# Patient Record
Sex: MALE | Race: OTHER | NOT HISPANIC OR LATINO | URBAN - METROPOLITAN AREA
[De-identification: names, ages, dates, MRNs, and addresses within clinical notes are randomized per-mention and may not be internally consistent; named-entity substitution may affect disease eponyms.]

---

## 2018-06-18 ENCOUNTER — EMERGENCY (EMERGENCY)
Facility: HOSPITAL | Age: 53
LOS: 1 days | Discharge: HOME | End: 2018-06-18
Admitting: EMERGENCY MEDICINE
Payer: MEDICAID

## 2018-06-18 VITALS
HEART RATE: 64 BPM | TEMPERATURE: 98 F | SYSTOLIC BLOOD PRESSURE: 136 MMHG | RESPIRATION RATE: 18 BRPM | OXYGEN SATURATION: 98 % | DIASTOLIC BLOOD PRESSURE: 76 MMHG

## 2018-06-18 PROCEDURE — 99283 EMERGENCY DEPT VISIT LOW MDM: CPT | Mod: 25

## 2018-06-18 PROCEDURE — 73562 X-RAY EXAM OF KNEE 3: CPT | Mod: 26,RT

## 2018-06-18 RX ORDER — IBUPROFEN 200 MG
800 TABLET ORAL ONCE
Qty: 0 | Refills: 0 | Status: COMPLETED | OUTPATIENT
Start: 2018-06-18 | End: 2018-06-18

## 2018-06-18 RX ADMIN — Medication 800 MILLIGRAM(S): at 13:37

## 2018-06-18 NOTE — ED PROVIDER NOTE - PHYSICAL EXAMINATION
R knee: no erythema, edema, warmth, effusion. no bony point tenderness. mild TTP Lateral knee. NO skin changes or signs of traum. 5/5 strength. FROM - pain w/ resistance to flexion only. +2 pulses. <2 cap refill. sensation intact    VITAL SIGNS: I have reviewed nursing notes and confirm.  CONSTITUTIONAL: Well-developed; well-nourished; in no acute distress.  SKIN: Skin is warm and dry, no acute rash.  HEAD: Normocephalic; atraumatic.  EYES: PERRL, EOM intact; conjunctiva and sclera clear.  ENT: No nasal discharge; airway clear.  NECK: Supple; non tender.  CARD: S1, S2 normal; no murmurs, gallops, or rubs. Regular rate and rhythm.  RESP: No wheezes, rales or rhonchi.  ABD: Normal bowel sounds; soft; non-distended; non-tender; no hepatosplenomegaly.  ALL OTHER EXT: Normal ROM. No clubbing, cyanosis or edema.  NEURO: Alert, oriented. Grossly unremarkable.  PSYCH: Cooperative, appropriate.

## 2018-06-18 NOTE — ED PROVIDER NOTE - MEDICAL DECISION MAKING DETAILS
Will get xrays, place ACE bandage, give RICE instructions. Will have pt f/u with ortho for likely MRI

## 2018-06-18 NOTE — ED ADULT NURSE NOTE - CHPI ED SYMPTOMS NEG
no numbness/no fever/no back pain/no stiffness/no weakness/no deformity/no tingling/no bruising/no abrasion
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
## 2018-06-22 DIAGNOSIS — S89.91XA UNSPECIFIED INJURY OF RIGHT LOWER LEG, INITIAL ENCOUNTER: ICD-10-CM

## 2018-06-22 DIAGNOSIS — M25.561 PAIN IN RIGHT KNEE: ICD-10-CM
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
## 2018-11-28 ENCOUNTER — EMERGENCY (EMERGENCY)
Facility: HOSPITAL | Age: 53
LOS: 1 days | Discharge: HOME | End: 2018-11-28
Admitting: EMERGENCY MEDICINE
Payer: MEDICAID

## 2018-11-28 VITALS
OXYGEN SATURATION: 97 % | SYSTOLIC BLOOD PRESSURE: 117 MMHG | RESPIRATION RATE: 18 BRPM | TEMPERATURE: 99 F | HEART RATE: 94 BPM | DIASTOLIC BLOOD PRESSURE: 75 MMHG

## 2018-11-28 LAB
ALBUMIN SERPL ELPH-MCNC: 3.8 G/DL — SIGNIFICANT CHANGE UP (ref 3.4–5)
ALP SERPL-CCNC: 54 U/L — SIGNIFICANT CHANGE UP (ref 40–120)
ALT FLD-CCNC: 17 U/L — SIGNIFICANT CHANGE UP (ref 12–42)
ANION GAP SERPL CALC-SCNC: 9 MMOL/L — SIGNIFICANT CHANGE UP (ref 9–16)
AST SERPL-CCNC: 16 U/L — SIGNIFICANT CHANGE UP (ref 15–37)
BASOPHILS NFR BLD AUTO: 0.3 % — SIGNIFICANT CHANGE UP (ref 0–2)
BILIRUB SERPL-MCNC: 0.6 MG/DL — SIGNIFICANT CHANGE UP (ref 0.2–1.2)
BUN SERPL-MCNC: 11 MG/DL — SIGNIFICANT CHANGE UP (ref 7–23)
CALCIUM SERPL-MCNC: 9.2 MG/DL — SIGNIFICANT CHANGE UP (ref 8.5–10.5)
CHLORIDE SERPL-SCNC: 104 MMOL/L — SIGNIFICANT CHANGE UP (ref 96–108)
CO2 SERPL-SCNC: 26 MMOL/L — SIGNIFICANT CHANGE UP (ref 22–31)
CREAT SERPL-MCNC: 1.33 MG/DL — HIGH (ref 0.5–1.3)
EOSINOPHIL NFR BLD AUTO: 1 % — SIGNIFICANT CHANGE UP (ref 0–6)
FLUAV SPEC QL CULT: NEGATIVE — SIGNIFICANT CHANGE UP
FLUBV AG SPEC QL IA: NEGATIVE — SIGNIFICANT CHANGE UP
GLUCOSE SERPL-MCNC: 117 MG/DL — HIGH (ref 70–99)
HCT VFR BLD CALC: 42 % — SIGNIFICANT CHANGE UP (ref 39–50)
HGB BLD-MCNC: 14.7 G/DL — SIGNIFICANT CHANGE UP (ref 13–17)
IMM GRANULOCYTES NFR BLD AUTO: 0.3 % — SIGNIFICANT CHANGE UP (ref 0–1.5)
LYMPHOCYTES # BLD AUTO: 8.3 % — LOW (ref 13–44)
MCHC RBC-ENTMCNC: 32.9 PG — SIGNIFICANT CHANGE UP (ref 27–34)
MCHC RBC-ENTMCNC: 35 G/DL — SIGNIFICANT CHANGE UP (ref 32–36)
MCV RBC AUTO: 94 FL — SIGNIFICANT CHANGE UP (ref 80–100)
MONOCYTES NFR BLD AUTO: 9.6 % — SIGNIFICANT CHANGE UP (ref 2–14)
NEUTROPHILS NFR BLD AUTO: 80.5 % — HIGH (ref 43–77)
NT-PROBNP SERPL-SCNC: 85 PG/ML — SIGNIFICANT CHANGE UP
PLATELET # BLD AUTO: 240 K/UL — SIGNIFICANT CHANGE UP (ref 150–400)
POTASSIUM SERPL-MCNC: 3.8 MMOL/L — SIGNIFICANT CHANGE UP (ref 3.5–5.3)
POTASSIUM SERPL-SCNC: 3.8 MMOL/L — SIGNIFICANT CHANGE UP (ref 3.5–5.3)
PROT SERPL-MCNC: 7.9 G/DL — SIGNIFICANT CHANGE UP (ref 6.4–8.2)
RBC # BLD: 4.47 M/UL — SIGNIFICANT CHANGE UP (ref 4.2–5.8)
RBC # FLD: 13.6 % — SIGNIFICANT CHANGE UP (ref 10.3–14.5)
SODIUM SERPL-SCNC: 139 MMOL/L — SIGNIFICANT CHANGE UP (ref 132–145)
TROPONIN I SERPL-MCNC: <0.017 NG/ML — LOW (ref 0.02–0.06)
WBC # BLD: 7.8 K/UL — SIGNIFICANT CHANGE UP (ref 3.8–10.5)
WBC # FLD AUTO: 7.8 K/UL — SIGNIFICANT CHANGE UP (ref 3.8–10.5)

## 2018-11-28 PROCEDURE — 93010 ELECTROCARDIOGRAM REPORT: CPT

## 2018-11-28 PROCEDURE — 71046 X-RAY EXAM CHEST 2 VIEWS: CPT | Mod: 26

## 2018-11-28 PROCEDURE — 99285 EMERGENCY DEPT VISIT HI MDM: CPT | Mod: 25

## 2018-11-28 RX ORDER — SODIUM CHLORIDE 9 MG/ML
1000 INJECTION INTRAMUSCULAR; INTRAVENOUS; SUBCUTANEOUS ONCE
Qty: 0 | Refills: 0 | Status: COMPLETED | OUTPATIENT
Start: 2018-11-28 | End: 2018-11-28

## 2018-11-28 RX ADMIN — Medication 100 MILLIGRAM(S): at 22:07

## 2018-11-28 RX ADMIN — SODIUM CHLORIDE 1000 MILLILITER(S): 9 INJECTION INTRAMUSCULAR; INTRAVENOUS; SUBCUTANEOUS at 22:08

## 2018-11-28 NOTE — ED PROVIDER NOTE - OBJECTIVE STATEMENT
52yo otherwise healthy M presents c/o severe cough since this morning. pt states when he coughs he gets some associated chest discomfort which is not present except when he is actively coughing. pt also describes some soreness in his lower abd, also present only when he coughs as he feels the muscles contract. pt also describes some headache and dizziness when he is actively coughing. pt describes some nausea this morning that has since resolved but he has had poor appetite as a result which he states is also contributing to his dizziness. pt denies fever, chills, vomiting, blood in stool, shortness of breath, vision changes, numbness, tingling, weakness. pt believes all symptoms are related to his cough.

## 2018-11-28 NOTE — ED PROVIDER NOTE - MEDICAL DECISION MAKING DETAILS
pt presents c/o cough with associated chest and abd pain only with active coughing. labs and cxr unremarkable. ekg unremarkable. pt feeling better with fluids and tessalon perles. will d/c.

## 2018-11-28 NOTE — ED PROVIDER NOTE - PROGRESS NOTE DETAILS
rechecked pt - discussed all results including cxr negative for pna, reviewed with dr. campbell. will d/c with tessalon perles as pt reports symptomatic improvement after tessalon perles and fluids.

## 2018-12-02 DIAGNOSIS — R51 HEADACHE: ICD-10-CM

## 2018-12-02 DIAGNOSIS — R05 COUGH: ICD-10-CM

## 2020-02-08 NOTE — ED ADULT NURSE NOTE - FINAL NURSING ELECTRONIC SIGNATURE
18-Jun-2018 14:48 Complex Repair And Tissue Cultured Epidermal Autograft Text: The defect edges were debeveled with a #15 scalpel blade.  The primary defect was closed partially with a complex linear closure.  Given the location of the defect, shape of the defect and the proximity to free margins an tissue cultured epidermal autograft was deemed most appropriate to repair the remaining defect.  The graft was trimmed to fit the size of the remaining defect.  The graft was then placed in the primary defect, oriented appropriately, and sutured into place.

## 2020-12-24 ENCOUNTER — EMERGENCY (EMERGENCY)
Facility: HOSPITAL | Age: 55
LOS: 1 days | Discharge: HOME | End: 2020-12-24
Attending: EMERGENCY MEDICINE | Admitting: EMERGENCY MEDICINE
Payer: MEDICAID

## 2020-12-24 VITALS
WEIGHT: 240.08 LBS | SYSTOLIC BLOOD PRESSURE: 142 MMHG | TEMPERATURE: 101 F | HEIGHT: 71 IN | HEART RATE: 84 BPM | DIASTOLIC BLOOD PRESSURE: 81 MMHG | OXYGEN SATURATION: 97 % | RESPIRATION RATE: 16 BRPM

## 2020-12-24 DIAGNOSIS — R50.9 FEVER, UNSPECIFIED: ICD-10-CM

## 2020-12-24 DIAGNOSIS — Z20.828 CONTACT WITH AND (SUSPECTED) EXPOSURE TO OTHER VIRAL COMMUNICABLE DISEASES: ICD-10-CM

## 2020-12-24 LAB
APPEARANCE UR: CLEAR — SIGNIFICANT CHANGE UP
BACTERIA # UR AUTO: PRESENT /HPF
BILIRUB UR-MCNC: NEGATIVE — SIGNIFICANT CHANGE UP
COLOR SPEC: YELLOW — SIGNIFICANT CHANGE UP
DIFF PNL FLD: ABNORMAL
EPI CELLS # UR: SIGNIFICANT CHANGE UP /HPF (ref 0–5)
GLUCOSE UR QL: NEGATIVE — SIGNIFICANT CHANGE UP
KETONES UR-MCNC: NEGATIVE — SIGNIFICANT CHANGE UP
LEUKOCYTE ESTERASE UR-ACNC: NEGATIVE — SIGNIFICANT CHANGE UP
NITRITE UR-MCNC: NEGATIVE — SIGNIFICANT CHANGE UP
PH UR: 7 — SIGNIFICANT CHANGE UP (ref 5–8)
PROT UR-MCNC: NEGATIVE MG/DL — SIGNIFICANT CHANGE UP
RBC CASTS # UR COMP ASSIST: ABNORMAL /HPF
SP GR SPEC: 1.01 — SIGNIFICANT CHANGE UP (ref 1–1.03)
UROBILINOGEN FLD QL: 0.2 E.U./DL — SIGNIFICANT CHANGE UP
WBC UR QL: < 5 /HPF — SIGNIFICANT CHANGE UP

## 2020-12-24 PROCEDURE — 99284 EMERGENCY DEPT VISIT MOD MDM: CPT

## 2020-12-24 RX ADMIN — Medication 500 MILLIGRAM(S): at 15:57

## 2020-12-24 NOTE — ED PROVIDER NOTE - OBJECTIVE STATEMENT
54 y/o M with PMHx of an umbilical hernia presents to the ED for 5 days of body aches, subjective fevers, and lower abdominal pain. Pt primarily came to the ED today for lower abdominal / umbilical pain. He denies diarrhea. Pt has not had a COVID-19 test done before. 54 y/o M with PMHx of an umbilical hernia presents to the ED for 5 days of body aches, subjective fevers, and lower abdominal pain. Pt primarily came to the ED today for lower abdominal / umbilical pain x 4 days.  cramping, intermittent.  He denies diarrhea. Pt has not had a COVID-19 test done before.

## 2020-12-24 NOTE — ED PROVIDER NOTE - PATIENT PORTAL LINK FT
You can access the FollowMyHealth Patient Portal offered by [facility] by registering at the following website: [email or web address]. By joining [email or web address]’s FollowMyHealth portal, you will also be able to view your health information using other applications (apps) compatible with our system.

## 2020-12-24 NOTE — ED PROVIDER NOTE - CLINICAL SUMMARY MEDICAL DECISION MAKING FREE TEXT BOX
56 y/o M is accompanied by his sister to the ED for 4 days of ARTURO symptoms. Pt did not have a swab done so far this year. Pt has sick contacts at home. On arrival, Pt has a low grade fever. No other vital sign derangements. Pt is well appearing. No abdominal tenderness. Normal work of breathing. Plan for urine and swab. 54 y/o M is accompanied by his sister to the ED for 4 days of ARTURO symptoms. Pt did not have a swab done so far this year. Pt has sick contacts at home. On arrival, Pt has a low grade fever. No other vital sign derangements. Pt is well appearing. No abdominal tenderness. Normal work of breathing. Plan for urine and COVID swab.

## 2020-12-24 NOTE — ED ADULT NURSE NOTE - OBJECTIVE STATEMENT
Pt. with no pmhx, comes in for fever, chills, body aches and dry cough. Pt. states his sister has similar symptoms last week. Pt. states he has not has COVID test done. Pt. also endorsing lower abdominal pain. Pt. denies n/v/d or  symptoms. SPO2 97% on RA on arrival. Pt. is speaking in full sentences with no use of accessory muscle use or nasal flaring. Pt. noted with dry cough.

## 2020-12-24 NOTE — ED PROVIDER NOTE - GASTROINTESTINAL, MLM
Abdomen soft, non-tender, no guarding. Abdomen soft, non-tender, no guarding. Negative McBurney's. No RUQ tenderness. No abdominal distension.

## 2020-12-26 LAB — SARS-COV-2 RNA SPEC QL NAA+PROBE: DETECTED

## 2021-02-27 ENCOUNTER — EMERGENCY (EMERGENCY)
Facility: HOSPITAL | Age: 56
LOS: 1 days | Discharge: HOME | End: 2021-02-27
Attending: EMERGENCY MEDICINE | Admitting: EMERGENCY MEDICINE
Payer: MEDICAID

## 2021-02-27 DIAGNOSIS — Z20.822 CONTACT WITH AND (SUSPECTED) EXPOSURE TO COVID-19: ICD-10-CM

## 2021-02-27 PROBLEM — K42.9 UMBILICAL HERNIA WITHOUT OBSTRUCTION OR GANGRENE: Chronic | Status: ACTIVE | Noted: 2020-12-24

## 2021-02-27 PROCEDURE — 99282 EMERGENCY DEPT VISIT SF MDM: CPT

## 2021-02-27 NOTE — ED PROVIDER NOTE - OBJECTIVE STATEMENT
56 y/o M presents to the ED requesting to have testing done for COVID-19. Pt endorses he is asymptomatic at this time. Pt denies fevers, chills, coughs, CP, and SOB.

## 2021-02-27 NOTE — ED PROVIDER NOTE - PATIENT PORTAL LINK FT
You can access the FollowMyHealth Patient Portal offered by [facility] by registering at the following website: [email or web address]. By joining [employer]’s FollowMyHealth portal, you will also be able to view your health information using other applications (apps) compatible with our system.

## 2021-02-28 LAB — SARS-COV-2 RNA SPEC QL NAA+PROBE: SIGNIFICANT CHANGE UP

## 2021-06-15 ENCOUNTER — EMERGENCY (EMERGENCY)
Facility: HOSPITAL | Age: 56
LOS: 1 days | Discharge: HOME | End: 2021-06-15
Admitting: EMERGENCY MEDICINE
Payer: MEDICAID

## 2021-06-15 VITALS
WEIGHT: 220.02 LBS | HEIGHT: 71 IN | RESPIRATION RATE: 18 BRPM | TEMPERATURE: 98 F | OXYGEN SATURATION: 95 % | HEART RATE: 76 BPM

## 2021-06-15 DIAGNOSIS — R10.33 PERIUMBILICAL PAIN: ICD-10-CM

## 2021-06-15 DIAGNOSIS — N32.89 OTHER SPECIFIED DISORDERS OF BLADDER: ICD-10-CM

## 2021-06-15 DIAGNOSIS — R10.84 GENERALIZED ABDOMINAL PAIN: ICD-10-CM

## 2021-06-15 DIAGNOSIS — N39.0 URINARY TRACT INFECTION, SITE NOT SPECIFIED: ICD-10-CM

## 2021-06-15 LAB
ALBUMIN SERPL ELPH-MCNC: 3.7 G/DL — SIGNIFICANT CHANGE UP (ref 3.4–5)
ALP SERPL-CCNC: 58 U/L — SIGNIFICANT CHANGE UP (ref 40–120)
ALT FLD-CCNC: 25 U/L — SIGNIFICANT CHANGE UP (ref 12–42)
ANION GAP SERPL CALC-SCNC: 12 MMOL/L — SIGNIFICANT CHANGE UP (ref 9–16)
AST SERPL-CCNC: 16 U/L — SIGNIFICANT CHANGE UP (ref 15–37)
BASOPHILS # BLD AUTO: 0.02 K/UL — SIGNIFICANT CHANGE UP (ref 0–0.2)
BASOPHILS NFR BLD AUTO: 0.3 % — SIGNIFICANT CHANGE UP (ref 0–2)
BILIRUB SERPL-MCNC: 0.4 MG/DL — SIGNIFICANT CHANGE UP (ref 0.2–1.2)
BUN SERPL-MCNC: 13 MG/DL — SIGNIFICANT CHANGE UP (ref 7–23)
CALCIUM SERPL-MCNC: 8.9 MG/DL — SIGNIFICANT CHANGE UP (ref 8.5–10.5)
CHLORIDE SERPL-SCNC: 105 MMOL/L — SIGNIFICANT CHANGE UP (ref 96–108)
CO2 SERPL-SCNC: 26 MMOL/L — SIGNIFICANT CHANGE UP (ref 22–31)
CREAT SERPL-MCNC: 1.21 MG/DL — SIGNIFICANT CHANGE UP (ref 0.5–1.3)
EOSINOPHIL # BLD AUTO: 0.15 K/UL — SIGNIFICANT CHANGE UP (ref 0–0.5)
EOSINOPHIL NFR BLD AUTO: 2.4 % — SIGNIFICANT CHANGE UP (ref 0–6)
GLUCOSE SERPL-MCNC: 131 MG/DL — HIGH (ref 70–99)
HCT VFR BLD CALC: 44.4 % — SIGNIFICANT CHANGE UP (ref 39–50)
HGB BLD-MCNC: 15.6 G/DL — SIGNIFICANT CHANGE UP (ref 13–17)
IMM GRANULOCYTES NFR BLD AUTO: 0.2 % — SIGNIFICANT CHANGE UP (ref 0–1.5)
LACTATE SERPL-SCNC: 1.3 MMOL/L — SIGNIFICANT CHANGE UP (ref 0.4–2)
LIDOCAIN IGE QN: 239 U/L — SIGNIFICANT CHANGE UP (ref 73–393)
LYMPHOCYTES # BLD AUTO: 3.05 K/UL — SIGNIFICANT CHANGE UP (ref 1–3.3)
LYMPHOCYTES # BLD AUTO: 49.3 % — HIGH (ref 13–44)
MCHC RBC-ENTMCNC: 33.2 PG — SIGNIFICANT CHANGE UP (ref 27–34)
MCHC RBC-ENTMCNC: 35.1 GM/DL — SIGNIFICANT CHANGE UP (ref 32–36)
MCV RBC AUTO: 94.5 FL — SIGNIFICANT CHANGE UP (ref 80–100)
MONOCYTES # BLD AUTO: 0.61 K/UL — SIGNIFICANT CHANGE UP (ref 0–0.9)
MONOCYTES NFR BLD AUTO: 9.9 % — SIGNIFICANT CHANGE UP (ref 2–14)
NEUTROPHILS # BLD AUTO: 2.35 K/UL — SIGNIFICANT CHANGE UP (ref 1.8–7.4)
NEUTROPHILS NFR BLD AUTO: 37.9 % — LOW (ref 43–77)
NRBC # BLD: 0 /100 WBCS — SIGNIFICANT CHANGE UP (ref 0–0)
PLATELET # BLD AUTO: 263 K/UL — SIGNIFICANT CHANGE UP (ref 150–400)
POTASSIUM SERPL-MCNC: 3.7 MMOL/L — SIGNIFICANT CHANGE UP (ref 3.5–5.3)
POTASSIUM SERPL-SCNC: 3.7 MMOL/L — SIGNIFICANT CHANGE UP (ref 3.5–5.3)
PROT SERPL-MCNC: 7.8 G/DL — SIGNIFICANT CHANGE UP (ref 6.4–8.2)
RBC # BLD: 4.7 M/UL — SIGNIFICANT CHANGE UP (ref 4.2–5.8)
RBC # FLD: 12.8 % — SIGNIFICANT CHANGE UP (ref 10.3–14.5)
SODIUM SERPL-SCNC: 143 MMOL/L — SIGNIFICANT CHANGE UP (ref 132–145)
TROPONIN I SERPL-MCNC: <0.017 NG/ML — LOW (ref 0.02–0.06)
WBC # BLD: 6.19 K/UL — SIGNIFICANT CHANGE UP (ref 3.8–10.5)
WBC # FLD AUTO: 6.19 K/UL — SIGNIFICANT CHANGE UP (ref 3.8–10.5)

## 2021-06-15 PROCEDURE — 99285 EMERGENCY DEPT VISIT HI MDM: CPT

## 2021-06-15 PROCEDURE — 74177 CT ABD & PELVIS W/CONTRAST: CPT | Mod: 26

## 2021-06-15 RX ORDER — SODIUM CHLORIDE 9 MG/ML
1000 INJECTION INTRAMUSCULAR; INTRAVENOUS; SUBCUTANEOUS ONCE
Refills: 0 | Status: COMPLETED | OUTPATIENT
Start: 2021-06-15 | End: 2021-06-15

## 2021-06-15 RX ORDER — FAMOTIDINE 10 MG/ML
20 INJECTION INTRAVENOUS ONCE
Refills: 0 | Status: COMPLETED | OUTPATIENT
Start: 2021-06-15 | End: 2021-06-15

## 2021-06-15 RX ADMIN — Medication 30 MILLILITER(S): at 22:58

## 2021-06-15 RX ADMIN — SODIUM CHLORIDE 1000 MILLILITER(S): 9 INJECTION INTRAMUSCULAR; INTRAVENOUS; SUBCUTANEOUS at 23:02

## 2021-06-15 RX ADMIN — FAMOTIDINE 20 MILLIGRAM(S): 10 INJECTION INTRAVENOUS at 22:58

## 2021-06-15 NOTE — ED PROVIDER NOTE - CARE PROVIDERS DIRECT ADDRESSES
[email or web address]

## 2021-06-15 NOTE — ED ADULT NURSE REASSESSMENT NOTE - NS ED NURSE REASSESS COMMENT FT1
Ezekiel Phoenix came to perform ekg but pt remains in the toilet, partner outside toilet, asked pt to come to room for assessment

## 2021-06-15 NOTE — ED PROVIDER NOTE - PATIENT PORTAL LINK FT
You can access the FollowMyHealth Patient Portal offered by [facility] by registering at the following website: [email or web address]. By joining [employer]’s FollowMyHealth portal, you will also be able to view your health information using other applications (apps) compatible with our system.

## 2021-06-15 NOTE — ED PROVIDER NOTE - CARE PROVIDER_API CALL
London Ly)  Urology  [address], [place] [address]  Phone: [phone]  Fax: [phone]  Follow Up Time:     Edwin Clements  UROLOGY  [address] [place] [id number]  Phone: [phone]  Fax: [phone]  Follow Up Time:

## 2021-06-15 NOTE — ED ADULT TRIAGE NOTE - HEIGHT IN INCHES
11 Case Management Discharge Note      Final Note: Discharged to home on 11/7/2020. ELLIOT Aguiar RN, CCP.    Provided Post Acute Provider List?: Refused  Refused Provider List Comment: Does not anticipate needs    Selected Continued Care - Discharged on 11/7/2020 Admission date: 11/3/2020 - Discharge disposition: Home or Self Care    Destination    No services have been selected for the patient.              Durable Medical Equipment    No services have been selected for the patient.              Dialysis/Infusion    No services have been selected for the patient.              Home Medical Care    No services have been selected for the patient.              Therapy    No services have been selected for the patient.              Community Resources    No services have been selected for the patient.                       Final Discharge Disposition Code: 01 - home or self-care

## 2021-06-15 NOTE — ED PROVIDER NOTE - CLINICAL SUMMARY MEDICAL DECISION MAKING FREE TEXT BOX
periumbilical pain x few days, soft nontender abdomen. dysuria x 1 month, CT shows prostatomegaly with borderline circumferential bladder wall thickening, will treat for UTI. well appearing, NAD. tolerating po. normal white count, advised f/u pmd, urology, return precautions discussed, will dc with family. all questions answered, patient agrees with plan

## 2021-06-15 NOTE — ED PROVIDER NOTE - OBJECTIVE STATEMENT
54 yo male with no sig pmhx presents c/o periumbilical pain x few days, constant, patient unable to describe pain, no alleviating or provoking factors. patient denies nausea, vomiting or diarrhea. no fever/chills. +burning upon urination x 1 month, works as . denies cp/sob. denies melena/hematochezia. denies previous abd surgeries.

## 2021-06-15 NOTE — ED PROVIDER NOTE - PHYSICAL EXAMINATION
CONSTITUTIONAL: Well-appearing; well-nourished; in no apparent distress.   	HEAD: Normocephalic; atraumatic.   	EYES:  conjunctiva and sclera clear  	ENT: normal nose; no rhinorrhea; normal pharynx with no erythema or lesions.   	NECK: Supple; non-tender;   	CARDIOVASCULAR: Normal S1, S2; no murmurs, rubs, or gallops. Regular rate and rhythm.   	RESPIRATORY: Breathing easily; breath sounds clear and equal bilaterally; no wheezes, rhonchi, or rales.  	GI: Soft; non-distended; minimal generalized tenderness. no guarding or rebound. no cvat bilaterally.   	EXT: No cyanosis or edema; N/V intact  	SKIN: Normal for age and race; warm; dry; good turgor; no apparent lesions or rash.   	NEURO: A & O x 3; face symmetric; grossly unremarkable.   PSYCHOLOGICAL: The patient’s mood and manner are appropriate.

## 2021-06-15 NOTE — ED ADULT TRIAGE NOTE - PAIN RATING/NUMBER SCALE (0-10): REST
----- Message from Keila Davis sent at 9/19/2018  8:26 AM CDT -----  Contact: Alexandra Traylor,   Pt needs a 1 week hosp f/u appt for Chest pain.   Pls call pt to arrange.      Thank you   6

## 2021-06-15 NOTE — ED PROVIDER NOTE - NSFOLLOWUPINSTRUCTIONS_ED_ALL_ED_FT
Please take antibiotics as prescribed  You have enlarged prostate on CT, please follow up with urologist and also primary care provider    RETURN FOR WORSENING PAIN, VOMITING, FEVER, OR ANY CONCERNING OR WORSENING SYMPTOMS.

## 2021-06-15 NOTE — ED ADULT TRIAGE NOTE - CHIEF COMPLAINT QUOTE
banding lower abdo pain since yesterday from lower abdo through to back denies N, V, D, but c/o of increased frequency in urine nil pmhx, nil meds, nKDA banding lower abdo pain since yesterday from lower abdo through to back denies N, V, D, but c/o of increased frequency in urine nil pmhx, nil meds, nKDA, unable to get BP at triage pt needed to go to the toilet

## 2021-06-15 NOTE — ED ADULT NURSE NOTE - CHPI ED NUR SYMPTOMS NEG
no abdominal distension/no blood in stool/no chills/no dysuria/no fever/no hematuria/no nausea/no vomiting

## 2021-06-15 NOTE — ED ADULT NURSE NOTE - OBJECTIVE STATEMENT
56 yo M c/o abd pain. Pt reports pain at umbilicus, B/L flank, radiating to back for the last 4-5 days, worsening since yesterday. One episode of diarrhea today. Reports burning urination x2 weeks. Denies CP, SOB, N/V, headache, dizziness, fever/chills, numbness/tingling, change in bowel or bladder habits. Pt speaking in full complete sentences, ambulatory with steady gait.

## 2021-06-16 VITALS
HEART RATE: 58 BPM | RESPIRATION RATE: 16 BRPM | TEMPERATURE: 98 F | DIASTOLIC BLOOD PRESSURE: 91 MMHG | OXYGEN SATURATION: 96 % | SYSTOLIC BLOOD PRESSURE: 135 MMHG

## 2021-06-16 LAB
C TRACH RRNA SPEC QL NAA+PROBE: SIGNIFICANT CHANGE UP
N GONORRHOEA RRNA SPEC QL NAA+PROBE: SIGNIFICANT CHANGE UP
SPECIMEN SOURCE: SIGNIFICANT CHANGE UP

## 2021-06-16 RX ORDER — CEFUROXIME AXETIL 250 MG
250 TABLET ORAL ONCE
Refills: 0 | Status: COMPLETED | OUTPATIENT
Start: 2021-06-16 | End: 2021-06-16

## 2021-06-16 RX ORDER — CEFUROXIME AXETIL 250 MG
1 TABLET ORAL
Qty: 14 | Refills: 0
Start: 2021-06-16 | End: 2021-06-22

## 2021-06-16 RX ADMIN — Medication 250 MILLIGRAM(S): at 02:03

## 2021-06-16 NOTE — ED ADULT NURSE REASSESSMENT NOTE - NS ED NURSE REASSESS COMMENT FT1
Pt resting on stretcher, no complaints of pain at this time. CT results pending. RR 16, unlabored. No acute distress noted.

## 2021-06-22 PROBLEM — Z00.00 ENCOUNTER FOR PREVENTIVE HEALTH EXAMINATION: Status: ACTIVE | Noted: 2021-06-22

## 2021-06-23 ENCOUNTER — APPOINTMENT (OUTPATIENT)
Dept: UROLOGY | Facility: CLINIC | Age: 56
End: 2021-06-23
Payer: MEDICAID

## 2021-06-23 VITALS
DIASTOLIC BLOOD PRESSURE: 89 MMHG | SYSTOLIC BLOOD PRESSURE: 135 MMHG | WEIGHT: 220 LBS | HEART RATE: 69 BPM | TEMPERATURE: 94.9 F | HEIGHT: 69 IN | BODY MASS INDEX: 32.58 KG/M2

## 2021-06-23 DIAGNOSIS — Z12.5 ENCOUNTER FOR SCREENING FOR MALIGNANT NEOPLASM OF PROSTATE: ICD-10-CM

## 2021-06-23 DIAGNOSIS — N39.0 URINARY TRACT INFECTION, SITE NOT SPECIFIED: ICD-10-CM

## 2021-06-23 PROCEDURE — 51798 US URINE CAPACITY MEASURE: CPT

## 2021-06-23 PROCEDURE — 99204 OFFICE O/P NEW MOD 45 MIN: CPT | Mod: 25

## 2021-06-24 LAB
APPEARANCE: CLEAR
BACTERIA: NEGATIVE
BILIRUBIN URINE: NEGATIVE
BLOOD URINE: NEGATIVE
COLOR: YELLOW
GLUCOSE QUALITATIVE U: NEGATIVE
HYALINE CASTS: 0 /LPF
KETONES URINE: NEGATIVE
LEUKOCYTE ESTERASE URINE: NEGATIVE
MICROSCOPIC-UA: NORMAL
NITRITE URINE: NEGATIVE
PH URINE: 6
PROTEIN URINE: NEGATIVE
RED BLOOD CELLS URINE: 1 /HPF
SPECIFIC GRAVITY URINE: 1.02
SQUAMOUS EPITHELIAL CELLS: 0 /HPF
UROBILINOGEN URINE: NORMAL
WHITE BLOOD CELLS URINE: 1 /HPF

## 2021-06-25 LAB — BACTERIA UR CULT: NORMAL

## 2021-06-25 NOTE — REASON FOR VISIT
[Initial Visit ___] : [unfilled] is here today for an initial visit  for [unfilled] Information could not be obtained

## 2021-06-25 NOTE — END OF VISIT
[FreeTextEntry3] : 54yo male went to ER with dysuria, periumbilical pain, treated empirically for UTI with resolution of symptoms. CT scan imaging reviewed, essentially unremarkable. Currently without symptoms, normal PVR today. Will repeat UA, culture. F/u 6 weeks for PSA screening.

## 2021-06-25 NOTE — PHYSICAL EXAM
[General Appearance - Well Developed] : well developed [General Appearance - Well Nourished] : well nourished [Normal Appearance] : normal appearance [Well Groomed] : well groomed [General Appearance - In No Acute Distress] : no acute distress [Abdomen Soft] : soft [Abdomen Tenderness] : non-tender [Costovertebral Angle Tenderness] : no ~M costovertebral angle tenderness [Normal Station and Gait] : the gait and station were normal for the patient's age [] : no rash [No Focal Deficits] : no focal deficits [Oriented To Time, Place, And Person] : oriented to person, place, and time [Affect] : the affect was normal [Mood] : the mood was normal [Not Anxious] : not anxious [FreeTextEntry1] : PVR = 40cc

## 2021-06-25 NOTE — HISTORY OF PRESENT ILLNESS
[None] : None [FreeTextEntry1] : 55 year old male recently treated at [facility] on 6/15 for c/o constant periumbilical pain x few days and dysuria x 1 month. Patient denied gross hematuria, N/V, diarrhea, and fever/chills at the time. Patient was treated for a presumed UTI with Cefuroxime for 7 days. Patient states he felt relief after starting the antibiotics. \par Currently denies urinary frequency, dysuria, gross hematuria, abdominal/flank pain, and fever/chills. \par Patient has not had his PSA checked by PCP or Urologist. \par \par 6/15 WBC 6.19 \par 6/15 Cr 1.21 \par 6/15 UCx negative \par \par CT A/P: Prostatomegaly with borderline circumferential bladder wall thickening likely secondary to chronic outlet obstruction.\par \par FamilyHx: Noncontributory \par SocialHx: Former smoker (quit 25 years ago, 5-10 cigarettes/day). Occasional ETOH use

## 2021-06-25 NOTE — ASSESSMENT
[FreeTextEntry1] : 54 yo male treated for a presume UTI w/Cefuroxime 7 days \par Reports improvement in symptoms after finishing antibiotics\par CT reviewed, essentially unremarkable\par No urinary complaints currently\par No recent PSA on file \par F/U in 6 weeks for PSA

## 2021-08-04 ENCOUNTER — APPOINTMENT (OUTPATIENT)
Dept: UROLOGY | Facility: CLINIC | Age: 56
End: 2021-08-04

## 2022-10-13 NOTE — ED PROVIDER NOTE - OBJECTIVE STATEMENT
Shower only
53 y/o M  no pmhx    Pt presents to Er with c/o R knee pain gradually [id number] x 5 days. States he tripped and twisted knee while at work stepping off a truck 5 days ago. States immediately afterwards he had no pain or difficulty walking but gradually it has become painful and difficult to bear weight specifically up stairs. Has been taking motrin w/o relief. Denies weakness, numbness

## 2023-01-25 ENCOUNTER — EMERGENCY (EMERGENCY)
Facility: HOSPITAL | Age: 58
LOS: 1 days | Discharge: HOME | End: 2023-01-25
Attending: EMERGENCY MEDICINE | Admitting: EMERGENCY MEDICINE
Payer: MEDICAID

## 2023-01-25 VITALS
HEART RATE: 77 BPM | WEIGHT: 251.33 LBS | DIASTOLIC BLOOD PRESSURE: 89 MMHG | OXYGEN SATURATION: 98 % | RESPIRATION RATE: 15 BRPM | TEMPERATURE: 98 F | HEIGHT: 72 IN | SYSTOLIC BLOOD PRESSURE: 147 MMHG

## 2023-01-25 PROCEDURE — 73562 X-RAY EXAM OF KNEE 3: CPT | Mod: 26,RT

## 2023-01-25 PROCEDURE — 99284 EMERGENCY DEPT VISIT MOD MDM: CPT

## 2023-01-25 RX ORDER — IBUPROFEN 200 MG
600 TABLET ORAL ONCE
Refills: 0 | Status: COMPLETED | OUTPATIENT
Start: 2023-01-25 | End: 2023-01-25

## 2023-01-25 RX ORDER — IBUPROFEN 200 MG
1 TABLET ORAL
Qty: 30 | Refills: 0
Start: 2023-01-25

## 2023-01-25 RX ADMIN — Medication 600 MILLIGRAM(S): at 18:23

## 2023-01-25 NOTE — ED PROVIDER NOTE - MUSCULOSKELETAL, MLM
right knee mild swelling/effusion. no redness/warmth. normal rom. reports discomfort to palpation lateral/ superior knee. no instability

## 2023-01-25 NOTE — ED PROVIDER NOTE - PATIENT PORTAL LINK FT
You can access the FollowMyHealth Patient Portal offered by [facility] by registering at the following website: [email or web address]. By joining [employer]’s FollowMyHealth portal, you will also be able to view your health information using other applications (apps) compatible with our system.

## 2023-01-25 NOTE — ED PROVIDER NOTE - WR ORDER ID 1
Pt here for Xgeva following MD appt.  Injection given without incidence and band aid applied.  Pt next appt reviewed and instructed to call sooner with concerns and understanding verbalized.      [id number]

## 2023-01-25 NOTE — ED ADULT TRIAGE NOTE - CHIEF COMPLAINT QUOTE
patient walk in with sister c/o right knee pain x3 days since stepping out of truck and twisted leg on the way down; +swelling; ambulating with steady gait

## 2023-01-25 NOTE — ED PROVIDER NOTE - OBJECTIVE STATEMENT
Serbian via sister per pt request, here with pain and swelling in right knee for past 3 days. Twisted awkwardly getting out of truck. Able to ambulate but with pain. No fever/ chills. Took tylenol without relief.

## 2023-01-25 NOTE — ED PROVIDER NOTE - CARE PROVIDER_API CALL
Elvin Ba)  Orthopaedic Surgery  [address] [address]  Phone: [phone]  Fax: [phone]  Follow Up Time:     Filiberto Saeed)  Orthopaedic Surgery  [address] [place] [address], [address]  [place] [address]  Phone: [phone]  Fax: [phone]  Follow Up Time:

## 2023-01-25 NOTE — ED PROVIDER NOTE - CLINICAL SUMMARY MEDICAL DECISION MAKING FREE TEXT BOX
twisting of knee with pain/swelling. suspect sprain/internal derangement. xray done neg for fracture. will place in immobilizer/ crutches/ f/u with ortho. motrin. return precautions discussed

## 2023-01-25 NOTE — ED PROVIDER NOTE - NSFOLLOWUPINSTRUCTIONS_ED_ALL_ED_FT
Please see orthopedics provider for followup.  Call for appointment.  If you have any problems with followup, please call the ED Referral Coordinator at [phone].  Return to the ER if symptoms worsen or other concerns.    Consulte al proveedor de ortopedia para el seguimiento. Llame para singh alla. Si tiene algún problema con el seguimiento, llame al coordinador de referencias de ED al [phone]. Regrese a la dimas de emergencias si los síntomas empeoran u    Esguince de rodilla    Knee Sprain       Un esguince de rodilla es un estiramiento o un desgarro de un ligamento de la rodilla. Los ligamentos de la rodilla son tejidos que conectan los huesos de la rodilla entre sí.      ¿Cuáles son las causas?    Esta afección suele ser provocada por lo siguiente:  •Singh caída.       •Singh lesión en la rodilla.        ¿Cuáles son los signos o síntomas?    Los síntomas de esta afección incluyen:  •Dificultad para estirar o doblar la pierna.       •Hinchazón en la rodilla.       •Moretones alrededor de la rodilla.       •Dolor a la palpación o dolor en la rodilla.       •Contracciones musculares súbitas (espasmos) alrededor de la rodilla.        ¿Cómo se trata?    El tratamiento de esta afección puede incluir lo siguiente:  •Mantener la rodilla quieta (inmovilizada) con un yeso, un dispositivo ortopédico o un férula.      •Aplicarse hielo en la rodilla. [place] ayuda a aliviar el dolor y reducir la hinchazón.      •Mantener la rodilla en alto (elevada) por encima del nivel del corazón cuando esté en reposo. [place] ayuda a aliviar el dolor y reducir la hinchazón.      •Nunu analgésicos.       •Hacer ejercicios para evitar que la rodilla se debilite o se ponga rígida.      •Someterse a singh cirugía. Esta puede ser necesaria si el ligamento se desgarra por completo.        Siga estas instrucciones en manuel casa:    Si tiene singh férula o un dispositivo ortopédico:     •Úselos nahomy se lo haya indicado el médico. Quíteselos solamente nahomy se lo haya indicado el médico.      •Controle todos los días la piel a manuel alrededor. Informe al médico si tiene alguna inquietud.    •Aflójeselos si los dedos del pie:  •Hormiguean.      •Se adormecen.      •Se tornan fríos y de color nicky.        •Manténgalos limpios y secos.      Si tiene un yeso:     • No introduzca nada en el interior para rascarse la piel.      •Controle todos los días la piel a manuel alrededor. Informe al médico si tiene alguna inquietud.      •Puede aplicar singh loción en la piel seca alrededor de los bordes del yeso. No aplique loción en la piel por debajo del yeso.      •Manténgalo limpio y seco.      Peter     Si tiene singh férula, un dispositivo ortopédico o un yeso que no son impermeables:  •No deje que se mojen.      •Cúbralos con un envoltorio hermético cuando tome un baño de inmersión o singh ducha.        Control del dolor, la rigidez y la hinchazón  •Aplique hielo sobre la edd lesionada si se lo indican. Para hacer esto:  •Si tiene un dispositivo ortopédico o singh férula desmontable, quíteselos nahomy se lo haya indicado el médico.      •Ponga el hielo en singh bolsa plástica.      •Coloque singh toalla entre la piel y la bolsa de hielo o el yeso y la bolsa de hielo.      •Coloque el hielo mu 20 minutos, 2 a 3 veces por día.        •Mueva los dedos del pie con frecuencia para reducir la rigidez y la hinchazón.      •Cuando esté sentado o acostado, mantenga la edd lesionada por encima del nivel del corazón.      Instrucciones generales    •Use los medicamentos de venta malcolm y los recetados solamente naohmy se lo haya indicado el médico.      •No consuma ningún producto que contenga nicotina o tabaco, nahomy cigarrillos, cigarrillos electrónicos y tabaco de mascar. Estos pueden retrasar la recuperación. Si necesita ayuda para dejar de fumar, consulte al médico.      •Digna los ejercicios nahomy se lo haya indicado el médico.      •Concurra a todas las visitas de seguimiento nahomy se lo haya indicado el médico. [place] es importante.        Comuníquese con un médico si:    •El dolor empeora.      •El yeso, el dispositivo ortopédico o la férula no le calzan fawn.      •El yeso, el dispositivo ortopédico o la férula se dañan.        Solicite ayuda de inmediato si:    •No puede usar la rodilla para apoyar el peso del cuerpo (soportar peso) para pararse o caminar.      •No puede  la edd lesionada.      •Se le dobla la rodilla o siente dolor después de hacer unos pasos.      •Tiene dolor intenso, hinchazón o entumecimiento en la pierna debajo del yeso, el dispositivo ortopédico o la férula.      •El pie o los dedos del pie están de color nicky, fríos o entumecidos después de aflojar la férula o el dispositivo ortopédico.        Resumen    •Un esguince de rodilla se produce cuando el tejido (ligamento) que conecta la rodilla con el hueso se estira o se rompe.      •Se puede usar singh férula, un dispositivo ortopédico o un yeso para mantener la rodilla quieta mientras se ness.      •Puede ser necesaria singh cirugía si el ligamento se desgarra por completo.      Esta información no tiene nahomy fin reemplazar el consejo del médico. Asegúrese de hacerle al médico cualquier pregunta que tenga.   otras inquietudes.

## 2023-01-25 NOTE — ED ADULT NURSE NOTE - OBJECTIVE STATEMENT
Pt walked in c/o of right knee and swelling x 3 days after twisting his knee while stepping out of a truck. Ambulatory with steady gait. Denies numbness/tingling.

## 2023-01-27 DIAGNOSIS — M25.561 PAIN IN RIGHT KNEE: ICD-10-CM

## 2023-01-27 DIAGNOSIS — S83.91XA SPRAIN OF UNSPECIFIED SITE OF RIGHT KNEE, INITIAL ENCOUNTER: ICD-10-CM
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
## 2023-02-02 ENCOUNTER — APPOINTMENT (OUTPATIENT)
Dept: ORTHOPEDIC SURGERY | Facility: CLINIC | Age: 58
End: 2023-02-02

## 2023-04-03 NOTE — ED ADULT NURSE NOTE - CHIEF COMPLAINT QUOTE
[de-identified] : Right elbow with resolved swelling, no erythema. Incision well healed both medial and lateral - no erythema nor drainage.  Arc from  with no pain. Pro/Sup to 80/80.  Able to make fist, oppose thumb to small finger and abduct fingers. Improving sensation at ulnar digits and hand. <2sec cap refill.\par \par Right elbow radiographs with evidence of concentric radiocapitellar and ulnohumeral joint with intact IJS; HO anterior to radial neck. 
banding lower abdo pain since yesterday from lower abdo through to back denies N, V, D, but c/o of increased frequency in urine nil pmhx, nil meds, nKDA, unable to get BP at triage pt needed to go to the toilet

## 2023-07-09 NOTE — ED ADULT NURSE NOTE - NS ED NURSE DC INFO COMPLEXITY
Simple: Patient demonstrates quick and easy understanding/Verbalized Understanding
09-Jul-2023 10:50
